# Patient Record
Sex: MALE | Race: WHITE | Employment: FULL TIME | URBAN - METROPOLITAN AREA
[De-identification: names, ages, dates, MRNs, and addresses within clinical notes are randomized per-mention and may not be internally consistent; named-entity substitution may affect disease eponyms.]

---

## 2017-06-12 ENCOUNTER — OFFICE VISIT (OUTPATIENT)
Dept: FAMILY MEDICINE CLINIC | Facility: CLINIC | Age: 27
End: 2017-06-12

## 2017-06-12 VITALS
WEIGHT: 170 LBS | DIASTOLIC BLOOD PRESSURE: 60 MMHG | OXYGEN SATURATION: 99 % | SYSTOLIC BLOOD PRESSURE: 114 MMHG | BODY MASS INDEX: 23.03 KG/M2 | TEMPERATURE: 98 F | RESPIRATION RATE: 20 BRPM | HEIGHT: 72 IN | HEART RATE: 59 BPM

## 2017-06-12 DIAGNOSIS — J02.9 SORE THROAT: ICD-10-CM

## 2017-06-12 DIAGNOSIS — J02.0 STREP THROAT: Primary | ICD-10-CM

## 2017-06-12 PROCEDURE — 87880 STREP A ASSAY W/OPTIC: CPT | Performed by: NURSE PRACTITIONER

## 2017-06-12 PROCEDURE — 99202 OFFICE O/P NEW SF 15 MIN: CPT | Performed by: NURSE PRACTITIONER

## 2017-06-12 RX ORDER — AZITHROMYCIN 250 MG/1
TABLET, FILM COATED ORAL
Qty: 6 TABLET | Refills: 0 | Status: SHIPPED | OUTPATIENT
Start: 2017-06-12 | End: 2018-06-27

## 2017-06-12 NOTE — PROGRESS NOTES
CHIEF COMPLAINT:   Patient presents with:  Sore Throat: s/s for 2 1/5 days      HPI:   Junie Aguirre is a 32year old male presents to clinic with symptoms of sore throat. Patient has had for 3 days. Symptoms have been worsening since onset.   Patient r LUNGS: clear to auscultation bilaterally, no wheezes or rhonchi. Breathing is non labored.   CARDIO: RRR without murmur  GI: good BS's,no masses, hepatosplenomegaly, or tenderness on direct palpation  EXTREMITIES: no cyanosis, clubbing or edema  LYMPH: pos · Rest at home. Drink plenty of fluids to avoid dehydration. · No work or school for the first 2 days of taking the antibiotics. After this time, you will not be contagious.  You can then return to school or work if you are feeling better.   · The antibiot © 3140-0112 [address], [address] [place]. All rights reserved. This information is not intended as a substitute for professional medical care. Always follow your healthcare professional's instructions.               Davonte Henry

## 2017-12-19 ENCOUNTER — OFFICE VISIT (OUTPATIENT)
Dept: FAMILY MEDICINE CLINIC | Facility: CLINIC | Age: 27
End: 2017-12-19

## 2017-12-19 VITALS
RESPIRATION RATE: 16 BRPM | WEIGHT: 168.38 LBS | HEART RATE: 88 BPM | BODY MASS INDEX: 22.81 KG/M2 | HEIGHT: 72 IN | TEMPERATURE: 99 F | DIASTOLIC BLOOD PRESSURE: 66 MMHG | OXYGEN SATURATION: 99 % | SYSTOLIC BLOOD PRESSURE: 112 MMHG

## 2017-12-19 DIAGNOSIS — R09.82 POST-NASAL DRIP: Primary | ICD-10-CM

## 2017-12-19 PROCEDURE — 99213 OFFICE O/P EST LOW 20 MIN: CPT | Performed by: NURSE PRACTITIONER

## 2017-12-19 NOTE — PROGRESS NOTES
CHIEF COMPLAINT:   Patient presents with:  Pharyngitis: mild sore throat x2 weeks (pt does have seasonal allergies) (no sinus issues)        HPI:   Mary Helms is a 32year old male presents to clinic with complaint of sore throat.  Patient has had nasal mucosa pink and non-inflammed  THROAT: oral mucosa pink, moist. Posterior pharynx erythematous and injected. no exudates. Tonsils 1/4. Breath no malodorous.  Thick white and yellow tinged mucous noted in throat   NECK: supple  LUNGS: clear to auscult

## 2019-03-21 ENCOUNTER — OFFICE VISIT (OUTPATIENT)
Dept: FAMILY MEDICINE CLINIC | Facility: CLINIC | Age: 29
End: 2019-03-21
Payer: COMMERCIAL

## 2019-03-21 VITALS
TEMPERATURE: 99 F | SYSTOLIC BLOOD PRESSURE: 119 MMHG | HEIGHT: 72 IN | BODY MASS INDEX: 22.03 KG/M2 | HEART RATE: 82 BPM | DIASTOLIC BLOOD PRESSURE: 66 MMHG | RESPIRATION RATE: 16 BRPM | WEIGHT: 162.63 LBS

## 2019-03-21 DIAGNOSIS — J02.9 SORE THROAT: ICD-10-CM

## 2019-03-21 DIAGNOSIS — J02.9 ACUTE PHARYNGITIS, UNSPECIFIED ETIOLOGY: Primary | ICD-10-CM

## 2019-03-21 LAB
CONTROL LINE PRESENT WITH A CLEAR BACKGROUND (YES/NO): YES YES/NO
STREP GRP A CUL-SCR: NEGATIVE

## 2019-03-21 PROCEDURE — 87880 STREP A ASSAY W/OPTIC: CPT | Performed by: NURSE PRACTITIONER

## 2019-03-21 PROCEDURE — 87081 CULTURE SCREEN ONLY: CPT | Performed by: NURSE PRACTITIONER

## 2019-03-21 PROCEDURE — 99213 OFFICE O/P EST LOW 20 MIN: CPT | Performed by: NURSE PRACTITIONER

## 2019-03-22 NOTE — PROGRESS NOTES
CHIEF COMPLAINT:   Patient presents with:  Sore Throat: 2 days, no fever, no headache, no n/v/d, no uri symptoms        HPI:   Thais Mann is a 25year old male presents to clinic with complaint of sore throat. Patient has had 2 days.  Symptoms have CARDIO: RRR without murmur  GI: good BS's,no masses, hepatosplenomegaly, or tenderness on direct palpation  EXTREMITIES: no cyanosis, clubbing or edema  LYMPH: No anterior cervical or submandibular lymphadenopathy.   No posterior cervical or occipital lymph A test has been done to find out if you or your child have strep throat. Call this facility or your healthcare provider if you were not given your test results. If the test is positive for strep infection, you will need to take antibiotic medicines.  A pres Other medicine for a child: You can give your child acetaminophen for fever, fussiness, or discomfort. In babies over 7 months of age, you may use ibuprofen instead of acetaminophen.  If your child has chronic liver or kidney disease or ever had a stomach u · Don’t have close contact with people who have sore throats, colds, or other upper respiratory infections. · Don’t smoke, and stay away from secondhand smoke. · Stay up to date with of your vaccines.   Date Last Reviewed: 11/1/2017  © 6373-1587 [employer]

## 2019-03-27 ENCOUNTER — OFFICE VISIT (OUTPATIENT)
Dept: FAMILY MEDICINE CLINIC | Facility: CLINIC | Age: 29
End: 2019-03-27
Payer: COMMERCIAL

## 2019-03-27 VITALS
SYSTOLIC BLOOD PRESSURE: 128 MMHG | OXYGEN SATURATION: 99 % | TEMPERATURE: 99 F | BODY MASS INDEX: 20.99 KG/M2 | WEIGHT: 155 LBS | DIASTOLIC BLOOD PRESSURE: 72 MMHG | HEIGHT: 72 IN | HEART RATE: 64 BPM

## 2019-03-27 DIAGNOSIS — H66.91 RIGHT ACUTE OTITIS MEDIA: Primary | ICD-10-CM

## 2019-03-27 PROCEDURE — 99213 OFFICE O/P EST LOW 20 MIN: CPT | Performed by: NURSE PRACTITIONER

## 2019-03-27 RX ORDER — AZITHROMYCIN 250 MG/1
TABLET, FILM COATED ORAL
Qty: 6 TABLET | Refills: 0 | Status: SHIPPED | OUTPATIENT
Start: 2019-03-27 | End: 2019-03-27

## 2019-03-27 RX ORDER — AZITHROMYCIN 250 MG/1
TABLET, FILM COATED ORAL
Qty: 6 TABLET | Refills: 0 | Status: SHIPPED | OUTPATIENT
Start: 2019-03-27 | End: 2021-07-29 | Stop reason: ALTCHOICE

## 2019-03-27 NOTE — PROGRESS NOTES
HPI:   Marion Perry is a 25year old male who presents with ill symptoms for  1  weeks. Patient reports cold symptoms (sore throat, congestion, sneezing) which are improving but now has right ear pain with muffled hearing.  Has tried Nyquil intermitten OTC decongestants, throat lozenges and Tylenol  and Zithromycin z-jaycee as directed for 5 days. Self care discussed. Medication use and risk/benefit discussed.  Patient is advised to follow up with PCP if not improving within 10-14 days of onset of illness or · Fluid or blood draining from the ear canal  · Fever of 100.4°F (38°C) or as advised   · Seizure  Date Last Reviewed: 6/1/2016  © 0282-0583 [employer]. [facility] [place], [address] [place] [address]. All rights reserved.  This information is not

## 2019-03-27 NOTE — PATIENT INSTRUCTIONS
Middle Ear Infection (Adult)  You have an infection of the middle ear, the space behind the eardrum. This is also called acute otitis media (AOM). Sometimes it is caused by the common cold.  This is because congestion can block the internal passage (eusta Please use Sudafed 12 hour decongestant twice daily through flight time. Chew gum on the flight to help ear pain. Follow up with PCP if not improved in 2 weeks or if symptoms worsen.

## 2019-04-04 ENCOUNTER — OFFICE VISIT (OUTPATIENT)
Dept: FAMILY MEDICINE CLINIC | Facility: CLINIC | Age: 29
End: 2019-04-04
Payer: COMMERCIAL

## 2019-04-04 VITALS
WEIGHT: 155 LBS | OXYGEN SATURATION: 98 % | HEART RATE: 56 BPM | SYSTOLIC BLOOD PRESSURE: 116 MMHG | DIASTOLIC BLOOD PRESSURE: 70 MMHG | TEMPERATURE: 99 F | RESPIRATION RATE: 16 BRPM | HEIGHT: 72 IN | BODY MASS INDEX: 20.99 KG/M2

## 2019-04-04 DIAGNOSIS — H66.91 OTITIS MEDIA NOT RESOLVED, RIGHT: Primary | ICD-10-CM

## 2019-04-04 DIAGNOSIS — J01.00 ACUTE NON-RECURRENT MAXILLARY SINUSITIS: ICD-10-CM

## 2019-04-04 PROCEDURE — 99213 OFFICE O/P EST LOW 20 MIN: CPT | Performed by: NURSE PRACTITIONER

## 2019-04-04 RX ORDER — FLUTICASONE PROPIONATE 50 MCG
2 SPRAY, SUSPENSION (ML) NASAL DAILY
Qty: 1 BOTTLE | Refills: 3 | Status: SHIPPED | OUTPATIENT
Start: 2019-04-04 | End: 2020-03-29

## 2019-04-04 RX ORDER — CEFDINIR 300 MG/1
300 CAPSULE ORAL 2 TIMES DAILY
Qty: 20 CAPSULE | Refills: 0 | Status: SHIPPED | OUTPATIENT
Start: 2019-04-04 | End: 2019-04-14

## 2019-04-05 NOTE — PROGRESS NOTES
Patient presents with:  Earache: f/u from 3/27       Ahsan Pedroza is a 25year old male who presents for a follow up from 3/27 where he was diagnosed with AOM of right ear.   He completed Z-Josh about 5 days ago but continues to have R ear pain and muff kg/m²   GENERAL: well developed, well nourished,in no apparent distress  SKIN: no rashes,no suspicious lesions  EYES:PERRLA, EOMI,Conjunctiva normal.  HEENT:  Head atraumatic, normocephalic, throat, oral mucosa are clear, no erythema. No sinus tenderness.

## 2021-07-29 ENCOUNTER — OFFICE VISIT (OUTPATIENT)
Dept: FAMILY MEDICINE CLINIC | Facility: CLINIC | Age: 31
End: 2021-07-29
Payer: COMMERCIAL

## 2021-07-29 VITALS
SYSTOLIC BLOOD PRESSURE: 130 MMHG | OXYGEN SATURATION: 100 % | TEMPERATURE: 99 F | HEART RATE: 78 BPM | WEIGHT: 161 LBS | HEIGHT: 72 IN | DIASTOLIC BLOOD PRESSURE: 72 MMHG | RESPIRATION RATE: 16 BRPM | BODY MASS INDEX: 21.81 KG/M2

## 2021-07-29 DIAGNOSIS — J02.9 SORE THROAT: Primary | ICD-10-CM

## 2021-07-29 DIAGNOSIS — Z20.822 ENCOUNTER FOR SCREENING LABORATORY TESTING FOR COVID-19 VIRUS: ICD-10-CM

## 2021-07-29 LAB
CONTROL LINE PRESENT WITH A CLEAR BACKGROUND (YES/NO): YES YES/NO
KIT LOT #: NORMAL NUMERIC
STREP GRP A CUL-SCR: NEGATIVE

## 2021-07-29 PROCEDURE — 87880 STREP A ASSAY W/OPTIC: CPT | Performed by: NURSE PRACTITIONER

## 2021-07-29 PROCEDURE — 99213 OFFICE O/P EST LOW 20 MIN: CPT | Performed by: NURSE PRACTITIONER

## 2021-07-29 PROCEDURE — 3075F SYST BP GE 130 - 139MM HG: CPT | Performed by: NURSE PRACTITIONER

## 2021-07-29 PROCEDURE — 3008F BODY MASS INDEX DOCD: CPT | Performed by: NURSE PRACTITIONER

## 2021-07-29 PROCEDURE — 3078F DIAST BP <80 MM HG: CPT | Performed by: NURSE PRACTITIONER

## 2021-07-29 NOTE — PROGRESS NOTES
CHIEF COMPLAINT:   Patient presents with:  Sore Throat: 1 week. Denies seasonal allergies. No fever, no other symptoms. No cough. +HX of strep throat        HPI:   Francisco J العلي is a 33year old male presents to clinic with complaint of sore throat. clear to auscultation bilaterally, no wheezes or rhonchi. Breathing is non labored.   CARDIO: RRR without murmur  GI: good BS's,no masses, hepatosplenomegaly, or tenderness on direct palpation  EXTREMITIES: no cyanosis, clubbing or edema  LYMPH: No anterior first thing after waking up. · Keep your throat moist by drinking 6 or more glasses of clear liquids every day. · Run a cool-air humidifier in your room overnight.   · Stay away from cigarette smoke.   · Check the air quality index,if air pollution gives directed by your healthcare provider  · White spots on the throat  · Great Trouble swallowing  · A skin rash  · Recent exposure to someone else with strep bacteria  · Severe hoarseness and swollen glands in the neck or jaw  Call 911  Call 911 if any of the

## 2021-07-30 LAB — SARS-COV-2 RNA RESP QL NAA+PROBE: NOT DETECTED

## 2022-06-11 ENCOUNTER — OFFICE VISIT (OUTPATIENT)
Dept: FAMILY MEDICINE CLINIC | Facility: CLINIC | Age: 32
End: 2022-06-11
Payer: COMMERCIAL

## 2022-06-11 VITALS
TEMPERATURE: 98 F | HEART RATE: 82 BPM | SYSTOLIC BLOOD PRESSURE: 125 MMHG | HEIGHT: 72 IN | WEIGHT: 155 LBS | RESPIRATION RATE: 18 BRPM | BODY MASS INDEX: 20.99 KG/M2 | DIASTOLIC BLOOD PRESSURE: 72 MMHG | OXYGEN SATURATION: 100 %

## 2022-06-11 DIAGNOSIS — R05.8 POST-VIRAL COUGH SYNDROME: Primary | ICD-10-CM

## 2022-06-11 PROCEDURE — 3074F SYST BP LT 130 MM HG: CPT | Performed by: NURSE PRACTITIONER

## 2022-06-11 PROCEDURE — 3008F BODY MASS INDEX DOCD: CPT | Performed by: NURSE PRACTITIONER

## 2022-06-11 PROCEDURE — 3078F DIAST BP <80 MM HG: CPT | Performed by: NURSE PRACTITIONER

## 2022-06-11 PROCEDURE — 99213 OFFICE O/P EST LOW 20 MIN: CPT | Performed by: NURSE PRACTITIONER

## 2022-06-11 RX ORDER — METHYLPREDNISOLONE 4 MG/1
TABLET ORAL
Qty: 1 EACH | Refills: 0 | Status: SHIPPED | OUTPATIENT
Start: 2022-06-11

## 2022-06-11 RX ORDER — BENZONATATE 200 MG/1
200 CAPSULE ORAL 3 TIMES DAILY PRN
Qty: 20 CAPSULE | Refills: 0 | Status: SHIPPED | OUTPATIENT
Start: 2022-06-11 | End: 2022-06-18

## 2022-12-13 ENCOUNTER — OFFICE VISIT (OUTPATIENT)
Dept: FAMILY MEDICINE CLINIC | Facility: CLINIC | Age: 32
End: 2022-12-13
Payer: COMMERCIAL

## 2022-12-13 VITALS
WEIGHT: 155 LBS | SYSTOLIC BLOOD PRESSURE: 114 MMHG | DIASTOLIC BLOOD PRESSURE: 68 MMHG | OXYGEN SATURATION: 96 % | RESPIRATION RATE: 18 BRPM | TEMPERATURE: 97 F | HEIGHT: 72 IN | BODY MASS INDEX: 20.99 KG/M2 | HEART RATE: 70 BPM

## 2022-12-13 DIAGNOSIS — R09.82 POST-NASAL DRAINAGE: ICD-10-CM

## 2022-12-13 DIAGNOSIS — J02.9 SORE THROAT: Primary | ICD-10-CM

## 2022-12-13 PROCEDURE — 3074F SYST BP LT 130 MM HG: CPT

## 2022-12-13 PROCEDURE — 87880 STREP A ASSAY W/OPTIC: CPT

## 2022-12-13 PROCEDURE — 3008F BODY MASS INDEX DOCD: CPT

## 2022-12-13 PROCEDURE — 3078F DIAST BP <80 MM HG: CPT

## 2022-12-13 PROCEDURE — 99213 OFFICE O/P EST LOW 20 MIN: CPT

## 2022-12-14 LAB — SARS-COV-2 RNA RESP QL NAA+PROBE: NOT DETECTED

## 2024-02-05 ENCOUNTER — OFFICE VISIT (OUTPATIENT)
Dept: FAMILY MEDICINE CLINIC | Facility: CLINIC | Age: 34
End: 2024-02-05
Payer: COMMERCIAL

## 2024-02-05 VITALS
HEART RATE: 64 BPM | WEIGHT: 150 LBS | OXYGEN SATURATION: 99 % | TEMPERATURE: 98 F | SYSTOLIC BLOOD PRESSURE: 134 MMHG | HEIGHT: 72 IN | DIASTOLIC BLOOD PRESSURE: 84 MMHG | BODY MASS INDEX: 20.32 KG/M2 | RESPIRATION RATE: 16 BRPM

## 2024-02-05 DIAGNOSIS — H10.33 ACUTE BACTERIAL CONJUNCTIVITIS OF BOTH EYES: Primary | ICD-10-CM

## 2024-02-05 RX ORDER — DOXYCYCLINE HYCLATE 100 MG/1
100 CAPSULE ORAL 2 TIMES DAILY
Qty: 20 CAPSULE | Refills: 0 | Status: SHIPPED | OUTPATIENT
Start: 2024-02-05 | End: 2024-02-15

## 2024-02-05 RX ORDER — TOBRAMYCIN 3 MG/ML
2 SOLUTION/ DROPS OPHTHALMIC
Qty: 5 ML | Refills: 0 | Status: SHIPPED | OUTPATIENT
Start: 2024-02-05 | End: 2024-02-12

## 2024-02-05 NOTE — PROGRESS NOTES
CHIEF COMPLAINT:     Chief Complaint   Patient presents with    Cough     1 week, cough, sore throat, waking up w/ eye discharge and redness last 2 days, otc mucinex, nyquil          HPI:   David J Brachmann is a 33 year old male who presents with chief complaint of \"pink eye\". Symptoms began  2  days ago. Symptoms have been worsening since onset.   Patient reports b/l eye redness, + discharge, + itching, + AM eyelid crusting, no vision change.   Denies fever, cold symptoms, or contact with irritant.    Treatments tried: nothing so far.   Pt also with cold sx for 1 week. Began with fever, but that resolved. Pt has continued to have cough and post nasal drip. Sx have been easing a bit for the last few days, then eye sx developed.   Pt is flying for work conference in 2 days and is concerned about sinus infection.     Current Outpatient Medications   Medication Sig Dispense Refill    tobramycin (TOBREX) 0.3 % Ophthalmic Solution Place 2 drops into both eyes every 4 (four) hours while awake for 7 days. 5 mL 0    doxycycline 100 MG Oral Cap Take 1 capsule (100 mg total) by mouth 2 (two) times daily for 10 days. 20 capsule 0    Podofilox 0.5 % External Solution Apply twice daily x 4 days, then reapply as needed. (Patient not taking: No sig reported) 3.5 Bottle 5     No current facility-administered medications for this visit.      No past medical history on file.   No past surgical history on file.   No family history on file.   Social History     Socioeconomic History    Marital status: Single   Tobacco Use    Smoking status: Never    Smokeless tobacco: Never         REVIEW OF SYSTEMS:   GENERAL: denies fever/chills, fatigue.  SKIN: no rashes  EYES:See HPI  HENT: denies ear pain, congestion, sore throat  LUNGS: denies shortness of breath or cough  CARDIOVASCULAR: denies chest pain or palpitations   GI: denies N/V/C or abdominal pain  NEURO: denies headaches     EXAM:   /84   Pulse 64   Temp 97.8 °F (36.6 °C)    Resp 16   Ht 6' (1.829 m)   Wt 150 lb (68 kg)   SpO2 99%   BMI 20.34 kg/m²   GENERAL: well developed, well nourished,in no apparent distress  SKIN: no rashes,no suspicious lesions  EYES: PERRLA, EOMI, normal optic disk, b/l conjunctiva erythematous, injected, + scant crusted discharge.  HENT: atraumatic, normocephalic. TMs pearly, posterior oropharynx cobblestoned and erythematous.  NECK: supple, non tender  LUNGS: clear to auscultation bilaterally.   CARDIO: RRR without murmur  LYMPH: no preauricular lymphadenopathy. No cervical lymphadenopathy    ASSESSMENT AND PLAN:   David J Brachmann is a 33 year old male who presents with:    ASSESSMENT:   Encounter Diagnosis   Name Primary?    Acute bacterial conjunctivitis of both eyes Yes       PLAN: Hygeine and comfort care as listen in patient instructions.  Medication as listed below     Requested Prescriptions     Signed Prescriptions Disp Refills    tobramycin (TOBREX) 0.3 % Ophthalmic Solution 5 mL 0     Sig: Place 2 drops into both eyes every 4 (four) hours while awake for 7 days.    doxycycline 100 MG Oral Cap 20 capsule 0     Sig: Take 1 capsule (100 mg total) by mouth 2 (two) times daily for 10 days.         Risks, benefits, complications and side effects of meds discussed.    Patient Instructions   Use eye drops-- 2 drops in each eye every 4 hours for 5-7 days. Use for 3 days past the first clear day.   On Day 1 you may treat the more affected eye every 2 hours.  Resume 4 hour intervals on Days 2-7.  You are contagious until you have been on antibiotic treatment for 24 hours so you should limit your contact at school or in public as much as possible.  Avoid touching eyes and wash hands frequently to reduce chance of spreading the infection to others.  Use warm compresses to help reduce swelling and inflammation.   Monitor symptoms and follow-up if no better in 2-3 days.  Follow-up immediately if any vision changes or eye pain develop.     Hold oral  antibiotics for now.   If symptoms worsen or persist beyond day 10, you may consider starting.   Take antibiotics with food and plenty of water.   Eat yogurt or take probiotic daily. (Align is a good example of an OTC probiotic)  Make sure to finish the entire antibiotic treatment.  Increase fluids and rest.   Use OTC meds for comfort as needed--  Ibuprofen/Tylenol for fever/pain  Use Benadryl at bedtime to reduce drainage and promote rest.  Zyrtec/Claritin/Allegra in the AM to reduce nasal drainage without sedation.   Use saline nasal sprays to reduce congestion and thin secretions.   Use Delsym for cough.   Consider applying bradley's vapo-rub or eucayptus oil to chest and feet at bedtime to reduce chest and nasal congestion.   Warm tea with honey, cough lozenges, vaporizers/steam etc.    Monitor symptoms and contact the office if no better in 2-3 days.

## 2024-02-05 NOTE — PATIENT INSTRUCTIONS
Use eye drops-- 2 drops in each eye every 4 hours for 5-7 days. Use for 3 days past the first clear day.   On Day 1 you may treat the more affected eye every 2 hours.  Resume 4 hour intervals on Days 2-7.  You are contagious until you have been on antibiotic treatment for 24 hours so you should limit your contact at school or in public as much as possible.  Avoid touching eyes and wash hands frequently to reduce chance of spreading the infection to others.  Use warm compresses to help reduce swelling and inflammation.   Monitor symptoms and follow-up if no better in 2-3 days.  Follow-up immediately if any vision changes or eye pain develop.     Hold oral antibiotics for now.   If symptoms worsen or persist beyond day 10, you may consider starting.   Take antibiotics with food and plenty of water.   Eat yogurt or take probiotic daily. (Align is a good example of an OTC probiotic)  Make sure to finish the entire antibiotic treatment.  Increase fluids and rest.   Use OTC meds for comfort as needed--  Ibuprofen/Tylenol for fever/pain  Use Benadryl at bedtime to reduce drainage and promote rest.  Zyrtec/Claritin/Allegra in the AM to reduce nasal drainage without sedation.   Use saline nasal sprays to reduce congestion and thin secretions.   Use Delsym for cough.   Consider applying bradley's vapo-rub or eucayptus oil to chest and feet at bedtime to reduce chest and nasal congestion.   Warm tea with honey, cough lozenges, vaporizers/steam etc.    Monitor symptoms and contact the office if no better in 2-3 days.

## 2024-02-12 ENCOUNTER — OFFICE VISIT (OUTPATIENT)
Dept: INTERNAL MEDICINE CLINIC | Facility: CLINIC | Age: 34
End: 2024-02-12
Payer: COMMERCIAL

## 2024-02-12 VITALS
RESPIRATION RATE: 16 BRPM | BODY MASS INDEX: 22.75 KG/M2 | DIASTOLIC BLOOD PRESSURE: 68 MMHG | SYSTOLIC BLOOD PRESSURE: 118 MMHG | HEIGHT: 72 IN | HEART RATE: 78 BPM | OXYGEN SATURATION: 98 % | TEMPERATURE: 98 F | WEIGHT: 168 LBS

## 2024-02-12 DIAGNOSIS — Z23 NEED FOR TDAP VACCINATION: ICD-10-CM

## 2024-02-12 DIAGNOSIS — Z00.00 ROUTINE GENERAL MEDICAL EXAMINATION AT A HEALTH CARE FACILITY: ICD-10-CM

## 2024-02-12 DIAGNOSIS — Z00.00 ANNUAL PHYSICAL EXAM: Primary | ICD-10-CM

## 2024-02-12 DIAGNOSIS — L98.9 SKIN LESION: ICD-10-CM

## 2024-02-12 PROCEDURE — 90715 TDAP VACCINE 7 YRS/> IM: CPT | Performed by: INTERNAL MEDICINE

## 2024-02-12 PROCEDURE — 90471 IMMUNIZATION ADMIN: CPT | Performed by: INTERNAL MEDICINE

## 2024-02-12 PROCEDURE — 3074F SYST BP LT 130 MM HG: CPT | Performed by: INTERNAL MEDICINE

## 2024-02-12 PROCEDURE — 3078F DIAST BP <80 MM HG: CPT | Performed by: INTERNAL MEDICINE

## 2024-02-12 PROCEDURE — 3008F BODY MASS INDEX DOCD: CPT | Performed by: INTERNAL MEDICINE

## 2024-02-12 PROCEDURE — 99385 PREV VISIT NEW AGE 18-39: CPT | Performed by: INTERNAL MEDICINE

## 2024-02-12 NOTE — PROGRESS NOTES
Subjective:   Patient ID: David J Brachmann is a 33 year old male.    HPI  David J Brachmann is a 33 year old male who presents for a complete physical exam.   HPI:   Pt complains of nothing    PAST MEDICAL, SOCIAL, FAMILY HISTORIES REVIEWED WITH PT  .    Immunization History   Administered Date(s) Administered    >=3 YRS TRI  MULTIDOSE VIAL (65546) FLU CLINIC 11/06/2015    Covid-19 Vaccine Moderna 100 mcg/0.5 ml 04/05/2021, 04/30/2021    Covid-19 Vaccine Pfizer 30 mcg/0.3 ml 12/12/2021    Covid-19 Vaccine Pfizer Bivalent 30mcg/0.3mL 10/28/2022    DT 05/22/2003    DTP 07/26/1988, 09/29/1988, 12/06/1988, 03/19/1990, 06/22/1993    FLUZONE 6 months and older PFS 0.5 ml (69072) 12/12/2021, 10/28/2022, 12/09/2023    Hep B, Unspecified Formulation 03/23/1998, 04/22/1998, 07/17/1998    Hib, Unspecified Formulation 12/14/1989    MMR 09/11/1989, 06/22/1993    Measles 04/27/1989    Moderna Covid-19 Vaccine 50mcg/0.5ml 12yrs+ (9013-6270) 12/09/2023    OPV 08/24/1988, 10/28/1988, 02/09/1989, 05/18/1990, 06/22/1993    TD 05/29/2012   Pended Date(s) Pended    TDAP 02/12/2024     Wt Readings from Last 6 Encounters:   02/12/24 168 lb (76.2 kg)   02/05/24 150 lb (68 kg)   12/13/22 155 lb (70.3 kg)   06/11/22 155 lb (70.3 kg)   07/29/21 161 lb (73 kg)   04/04/19 155 lb (70.3 kg)     Body mass index is 22.78 kg/m².     No results found for: \"GLU\", \"GLUCOSE\"  No results found for: \"CHOLEST\"  No results found for: \"HDL\"  No results found for: \"LDL\"  No results found for: \"AST\"  No results found for: \"ALT\"  No results found for: \"PSA\"     No current outpatient medications on file.      History reviewed. No pertinent past medical history.   History reviewed. No pertinent surgical history.   History reviewed. No pertinent family history.   Social History:  Social History     Socioeconomic History    Marital status: Single   Tobacco Use    Smoking status: Never    Smokeless tobacco: Never   Vaping Use    Vaping Use: Never used   Substance  and Sexual Activity    Alcohol use: Yes    Drug use: Never      Occ: yes...   Exercise:  twice per week, three times per week.  Diet: watches fats closely and watches sugar closely     REVIEW OF SYSTEMS:     A comprehensive 10 point review of systems was completed.     Pertinent positives and negatives noted in the HPI.    EXAM:   /68   Pulse 78   Temp 98 °F (36.7 °C)   Resp 16   Ht 6' (1.829 m)   Wt 168 lb (76.2 kg)   SpO2 98%   BMI 22.78 kg/m²   Body mass index is 22.78 kg/m².   GENERAL: well developed, well nourished,in no apparent distress  SKIN: no rashes,no suspicious lesions  HEENT: atraumatic, normocephalic,ears and throat are clear  EYES:PERRLA, EOMI, normal optic disk,conjunctiva are clear  NECK: supple,no adenopathy,no bruits  LUNGS: clear to auscultation  CARDIO: RRR without murmur  GI: good BS's,no masses, HSM or tenderness  MUSCULOSKELETAL: back is not tender  EXTREMITIES: no cyanosis, clubbing or edema  NEURO: Oriented times three,,motor and sensory are grossly intact    ASSESSMENT AND PLAN:   David J Brachmann is a 33 year old male who presents for a complete physical exam. Body mass index is 22.78 kg/m²., recommended low fat diet and aerobic exercise 30 minutes three times weekly.   Health maintenance, will check fasting Lipids, CMP, CBC  Pt info handouts given for: exercise, low fat diet  The patient indicates understanding of these issues and agrees to the plan.  The patient is asked to return for CPX in 12 m.    History/Other:   Review of Systems  No current outpatient medications on file.     Allergies:  Allergies   Allergen Reactions    Penicillins HIVES       Objective:   Physical Exam    Assessment & Plan:   1. Annual physical exam    2. Need for Tdap vaccination    3. Routine general medical examination at a health care facility    4. Skin lesion        Orders Placed This Encounter   Procedures    CBC With Differential With Platelet    Comp Metabolic Panel (14)    Lipid Panel     TSH W Reflex To Free T4    Urinalysis, Routine    TdaP (Adacel, Boostrix) [39768]       Meds This Visit:  Requested Prescriptions      No prescriptions requested or ordered in this encounter       Imaging & Referrals:  TETANUS, DIPHTHERIA TOXOIDS AND ACELLULAR PERTUSIS VACCINE (TDAP), >7 YEARS, IM USE  DERM - INTERNAL

## 2024-02-13 ENCOUNTER — LAB ENCOUNTER (OUTPATIENT)
Dept: LAB | Age: 34
End: 2024-02-13
Attending: INTERNAL MEDICINE
Payer: COMMERCIAL

## 2024-02-13 DIAGNOSIS — Z00.00 ROUTINE GENERAL MEDICAL EXAMINATION AT A HEALTH CARE FACILITY: ICD-10-CM

## 2024-02-13 LAB
ALBUMIN SERPL-MCNC: 4.2 G/DL (ref 3.4–5)
ALBUMIN/GLOB SERPL: 1.1 {RATIO} (ref 1–2)
ALP LIVER SERPL-CCNC: 67 U/L
ALT SERPL-CCNC: 20 U/L
ANION GAP SERPL CALC-SCNC: 4 MMOL/L (ref 0–18)
AST SERPL-CCNC: 15 U/L (ref 15–37)
BASOPHILS # BLD AUTO: 0.02 X10(3) UL (ref 0–0.2)
BASOPHILS NFR BLD AUTO: 0.3 %
BILIRUB SERPL-MCNC: 0.2 MG/DL (ref 0.1–2)
BILIRUB UR QL STRIP.AUTO: NEGATIVE
BUN BLD-MCNC: 27 MG/DL (ref 9–23)
CALCIUM BLD-MCNC: 9 MG/DL (ref 8.5–10.1)
CHLORIDE SERPL-SCNC: 107 MMOL/L (ref 98–112)
CHOLEST SERPL-MCNC: 162 MG/DL (ref ?–200)
CLARITY UR REFRACT.AUTO: CLEAR
CO2 SERPL-SCNC: 27 MMOL/L (ref 21–32)
CREAT BLD-MCNC: 1.16 MG/DL
EGFRCR SERPLBLD CKD-EPI 2021: 84 ML/MIN/1.73M2 (ref 60–?)
EOSINOPHIL # BLD AUTO: 0.15 X10(3) UL (ref 0–0.7)
EOSINOPHIL NFR BLD AUTO: 2.1 %
ERYTHROCYTE [DISTWIDTH] IN BLOOD BY AUTOMATED COUNT: 12.9 %
FASTING PATIENT LIPID ANSWER: YES
FASTING STATUS PATIENT QL REPORTED: YES
GLOBULIN PLAS-MCNC: 3.9 G/DL (ref 2.8–4.4)
GLUCOSE BLD-MCNC: 91 MG/DL (ref 70–99)
GLUCOSE UR STRIP.AUTO-MCNC: NORMAL MG/DL
HCT VFR BLD AUTO: 47.3 %
HDLC SERPL-MCNC: 40 MG/DL (ref 40–59)
HGB BLD-MCNC: 15.7 G/DL
IMM GRANULOCYTES # BLD AUTO: 0.02 X10(3) UL (ref 0–1)
IMM GRANULOCYTES NFR BLD: 0.3 %
KETONES UR STRIP.AUTO-MCNC: NEGATIVE MG/DL
LDLC SERPL CALC-MCNC: 103 MG/DL (ref ?–100)
LEUKOCYTE ESTERASE UR QL STRIP.AUTO: NEGATIVE
LYMPHOCYTES # BLD AUTO: 1.42 X10(3) UL (ref 1–4)
LYMPHOCYTES NFR BLD AUTO: 19.6 %
MCH RBC QN AUTO: 31.4 PG (ref 26–34)
MCHC RBC AUTO-ENTMCNC: 33.2 G/DL (ref 31–37)
MCV RBC AUTO: 94.6 FL
MONOCYTES # BLD AUTO: 0.57 X10(3) UL (ref 0.1–1)
MONOCYTES NFR BLD AUTO: 7.9 %
NEUTROPHILS # BLD AUTO: 5.07 X10 (3) UL (ref 1.5–7.7)
NEUTROPHILS # BLD AUTO: 5.07 X10(3) UL (ref 1.5–7.7)
NEUTROPHILS NFR BLD AUTO: 69.8 %
NITRITE UR QL STRIP.AUTO: NEGATIVE
NONHDLC SERPL-MCNC: 122 MG/DL (ref ?–130)
OSMOLALITY SERPL CALC.SUM OF ELEC: 291 MOSM/KG (ref 275–295)
PH UR STRIP.AUTO: 5.5 [PH] (ref 5–8)
PLATELET # BLD AUTO: 227 10(3)UL (ref 150–450)
POTASSIUM SERPL-SCNC: 4.7 MMOL/L (ref 3.5–5.1)
PROT SERPL-MCNC: 8.1 G/DL (ref 6.4–8.2)
PROT UR STRIP.AUTO-MCNC: NEGATIVE MG/DL
RBC # BLD AUTO: 5 X10(6)UL
RBC UR QL AUTO: NEGATIVE
SODIUM SERPL-SCNC: 138 MMOL/L (ref 136–145)
SP GR UR STRIP.AUTO: 1.03 (ref 1–1.03)
TRIGL SERPL-MCNC: 103 MG/DL (ref 30–149)
TSI SER-ACNC: 1.65 MIU/ML (ref 0.36–3.74)
UROBILINOGEN UR STRIP.AUTO-MCNC: NORMAL MG/DL
VLDLC SERPL CALC-MCNC: 17 MG/DL (ref 0–30)
WBC # BLD AUTO: 7.3 X10(3) UL (ref 4–11)

## 2024-02-13 PROCEDURE — 81003 URINALYSIS AUTO W/O SCOPE: CPT | Performed by: INTERNAL MEDICINE

## 2024-02-13 PROCEDURE — 80061 LIPID PANEL: CPT | Performed by: INTERNAL MEDICINE

## 2024-02-13 PROCEDURE — 80050 GENERAL HEALTH PANEL: CPT | Performed by: INTERNAL MEDICINE

## 2025-04-08 ENCOUNTER — OFFICE VISIT (OUTPATIENT)
Dept: INTERNAL MEDICINE CLINIC | Facility: CLINIC | Age: 35
End: 2025-04-08
Payer: COMMERCIAL

## 2025-04-08 VITALS
WEIGHT: 170 LBS | SYSTOLIC BLOOD PRESSURE: 120 MMHG | DIASTOLIC BLOOD PRESSURE: 72 MMHG | TEMPERATURE: 98 F | RESPIRATION RATE: 16 BRPM | OXYGEN SATURATION: 99 % | BODY MASS INDEX: 23.03 KG/M2 | HEART RATE: 55 BPM | HEIGHT: 72 IN

## 2025-04-08 DIAGNOSIS — Z00.00 ANNUAL PHYSICAL EXAM: Primary | ICD-10-CM

## 2025-04-08 DIAGNOSIS — Z00.00 ROUTINE GENERAL MEDICAL EXAMINATION AT A HEALTH CARE FACILITY: ICD-10-CM

## 2025-04-08 PROCEDURE — 3008F BODY MASS INDEX DOCD: CPT | Performed by: INTERNAL MEDICINE

## 2025-04-08 PROCEDURE — 3078F DIAST BP <80 MM HG: CPT | Performed by: INTERNAL MEDICINE

## 2025-04-08 PROCEDURE — 3074F SYST BP LT 130 MM HG: CPT | Performed by: INTERNAL MEDICINE

## 2025-04-08 PROCEDURE — 99395 PREV VISIT EST AGE 18-39: CPT | Performed by: INTERNAL MEDICINE

## 2025-04-08 NOTE — PROGRESS NOTES
Subjective:   Patient ID: David J Brachmann is a 34 year old male.    HPI  David J Brachmann is a 34 year old male who presents for a complete physical exam.   HPI:   Pt reports normal state of health    PAST MEDICAL, SOCIAL, FAMILY HISTORIES REVIEWED WITH PT  .    Immunization History   Administered Date(s) Administered    >=3 YRS TRI  MULTIDOSE VIAL (67925) FLU CLINIC 11/06/2015    Covid-19 Vaccine Moderna 100 mcg/0.5 ml 04/05/2021, 04/30/2021    Covid-19 Vaccine Pfizer 30 mcg/0.3 ml 12/12/2021    Covid-19 Vaccine Pfizer Bivalent 30mcg/0.3mL 10/28/2022    DT 05/22/2003    DTP 07/26/1988, 09/29/1988, 12/06/1988, 03/19/1990, 06/22/1993    FLUZONE 6 months and older PFS 0.5 ml (62411) 12/12/2021, 10/28/2022, 10/28/2022, 12/09/2023, 12/09/2023    Hep B, Unspecified Formulation 03/23/1998, 04/22/1998, 07/17/1998    Hib, Unspecified Formulation 12/14/1989    MMR 09/11/1989, 06/22/1993    Measles 04/27/1989    Moderna Covid-19 Vaccine 50mcg/0.5ml 12yrs+ 12/09/2023    OPV 08/24/1988, 08/24/1988, 10/28/1988, 10/28/1988, 02/09/1989, 02/09/1989, 05/18/1990, 05/18/1990, 06/22/1993, 06/22/1993    TD 05/29/2012    TDAP 02/12/2024     Wt Readings from Last 6 Encounters:   04/08/25 170 lb (77.1 kg)   02/12/24 168 lb (76.2 kg)   02/05/24 150 lb (68 kg)   12/13/22 155 lb (70.3 kg)   06/11/22 155 lb (70.3 kg)   07/29/21 161 lb (73 kg)     Body mass index is 23.06 kg/m².     Lab Results   Component Value Date    GLU 91 02/13/2024     Lab Results   Component Value Date    CHOLEST 162 02/13/2024     Lab Results   Component Value Date    HDL 40 02/13/2024     Lab Results   Component Value Date     (H) 02/13/2024     Lab Results   Component Value Date    AST 15 02/13/2024     Lab Results   Component Value Date    ALT 20 02/13/2024     No results found for: \"PSA\"     No current outpatient medications on file.      History reviewed. No pertinent past medical history.   History reviewed. No pertinent surgical history.   History  reviewed. No pertinent family history.   Social History:  Social History     Socioeconomic History    Marital status: Single   Tobacco Use    Smoking status: Never    Smokeless tobacco: Never   Vaping Use    Vaping status: Never Used   Substance and Sexual Activity    Alcohol use: Yes    Drug use: Never      Occ: yes. .   Exercise:  twice per week, three times per week.  Diet: watches fats closely and watches sugar closely     REVIEW OF SYSTEMS:   GENERAL: feels well otherwise  A comprehensive 10 point review of systems was completed.     Pertinent positives and negatives noted in the HPI.      EXAM:   /72   Pulse 55   Temp 97.8 °F (36.6 °C)   Resp 16   Ht 6' (1.829 m)   Wt 170 lb (77.1 kg)   SpO2 99%   BMI 23.06 kg/m²   Body mass index is 23.06 kg/m².   GENERAL: well developed, well nourished,in no apparent distress  SKIN: no rashes,no suspicious lesions  HEENT: atraumatic, normocephalic,ears and throat are clear  EYES:PERRLA, EOMI, conjunctiva are clear  NECK: supple,no adenopathy,no bruits  LUNGS: clear to auscultation  CARDIO: RRR without murmur  GI: good BS's,no masses, HSM or tenderness  : deferred  MUSCULOSKELETAL: back is not tender  EXTREMITIES: no cyanosis, clubbing or edema  NEURO: Oriented times three,motor and sensory are grossly intact    ASSESSMENT AND PLAN:   David J Brachmann is a 34 year old male who presents for a complete physical exam.  Body mass index is 23.06 kg/m²., recommended low fat diet and aerobic exercise 30 minutes three times weekly.     Health maintenance, will check fasting Lipids, CMP, CBC     Pt info handouts given for: exercise, low fat diet,    The patient indicates understanding of these issues and agrees to the plan.  The patient is asked to return for CPX in 12 m.    History/Other:   Review of Systems  No current outpatient medications on file.     Allergies:Allergies[1]    Objective:   Physical Exam    Assessment & Plan:   1. Annual physical exam    2. Routine  general medical examination at a health care facility        Orders Placed This Encounter   Procedures    CBC With Differential With Platelet    Comp Metabolic Panel (14)    Lipid Panel    TSH W Reflex To Free T4    Urinalysis, Routine       Meds This Visit:  Requested Prescriptions      No prescriptions requested or ordered in this encounter       Imaging & Referrals:  None         [1]   Allergies  Allergen Reactions    Penicillins HIVES

## (undated) NOTE — MR AVS SNAPSHOT
[facility]  [address] [facility]  [place] [address]  [phone]               Thank you for choosing us for your health care visit with ADAM Og. We are glad to serve you and happy to provide you with this summary of your visit.   Carmelina (NOTE: If you have chronic liver or kidney disease or ever had a stomach ulcer or GI bleeding, talk with your doctor before using these medicines.)  · Throat lozenges or sprays (such as Chloraseptic) help reduce pain.  Gargling with warm salt water will als Fhxea-Yucbj-Buozrzy-Pramoxine 1 % Oint   Apply 1 Application topically 2 (two) times daily. Commonly known as:  NEOSPORIN + PAIN RELIEF MAX ST           Sertraline HCl 25 MG Tabs   Take 25 mg by mouth daily.    Commonly known as:  ZOLOFT                W